# Patient Record
Sex: MALE | Race: WHITE | Employment: FULL TIME | URBAN - METROPOLITAN AREA
[De-identification: names, ages, dates, MRNs, and addresses within clinical notes are randomized per-mention and may not be internally consistent; named-entity substitution may affect disease eponyms.]

---

## 2021-07-12 ENCOUNTER — OFFICE VISIT (OUTPATIENT)
Dept: URGENT CARE | Facility: URGENT CARE | Age: 56
End: 2021-07-12
Payer: COMMERCIAL

## 2021-07-12 VITALS
RESPIRATION RATE: 12 BRPM | HEIGHT: 70 IN | OXYGEN SATURATION: 100 % | TEMPERATURE: 97.7 F | BODY MASS INDEX: 27.2 KG/M2 | WEIGHT: 190 LBS

## 2021-07-12 DIAGNOSIS — R42 VERTIGO: Primary | ICD-10-CM

## 2021-07-12 DIAGNOSIS — R03.0 ELEVATED BLOOD PRESSURE READING WITHOUT DIAGNOSIS OF HYPERTENSION: ICD-10-CM

## 2021-07-12 DIAGNOSIS — R11.2 NAUSEA AND VOMITING, INTRACTABILITY OF VOMITING NOT SPECIFIED, UNSPECIFIED VOMITING TYPE: ICD-10-CM

## 2021-07-12 PROCEDURE — 99204 OFFICE O/P NEW MOD 45 MIN: CPT | Performed by: FAMILY MEDICINE

## 2021-07-12 PROCEDURE — 93000 ELECTROCARDIOGRAM COMPLETE: CPT | Performed by: FAMILY MEDICINE

## 2021-07-12 RX ORDER — ONDANSETRON 4 MG/1
4 TABLET, FILM COATED ORAL EVERY 8 HOURS PRN
Qty: 9 TABLET | Refills: 0 | Status: SHIPPED | OUTPATIENT
Start: 2021-07-12

## 2021-07-12 RX ORDER — MECLIZINE HYDROCHLORIDE 25 MG/1
25 TABLET ORAL 3 TIMES DAILY PRN
Qty: 21 TABLET | Refills: 0 | Status: SHIPPED | OUTPATIENT
Start: 2021-07-12 | End: 2021-07-19

## 2021-07-12 ASSESSMENT — MIFFLIN-ST. JEOR: SCORE: 1698.08

## 2021-07-12 NOTE — PATIENT INSTRUCTIONS
Patient Education     Managing Dizziness (Vertigo) with Medicines    Although medicines can't cure all of your problems, they can help control your symptoms. Your doctor may prescribe medicines for a few weeks and then taper them off. Always take your medicine as prescribed. Never share your medicine with others.   Contact your healthcare provider right away if you have side effects from your medicines. Before using a new over-the-counter medicine, check with your healthcare provider or the pharmacist to be certain there won't be an interaction with other medicines you are taking.   How medicines can help    Treat infection or inflammation. If you have an infection caused by bacteria, your doctor can prescribe antibiotics.    Limit conflicting balance signals. These medicines are often in pill form.    Ease nausea. Suppositories, pills, or shots can reduce vomiting.    Reduce pressure in the canals. Diuretics can be used to treat Ménière's disease. These medicines help your body get rid of extra fluid.    Ease other symptoms. Other medicines can help ease depression and anxiety caused by living with dizziness or fainting.    [employer] last reviewed this educational content on 9/1/2019 2000-2021 [employer]. All rights reserved. This information is not intended as a substitute for professional medical care. Always follow your healthcare professional's instructions.           Patient Education     Dizziness (Vertigo) and Balance Problems: Staying Safe      Replace burned-out light bulbs to keep your home safe and well lit.   Falls or accidents can lead to pain, broken bones, a hospital stay, and a fear of future falls. Protect yourself and others by preparing for episodes. Simple steps can help you stay safe at home and wherever you go.  Lighting  Keep all areas well lit. This helps your eyes send the right signals to the brain. It also makes you less likely to trip and fall. If bright lights make  symptoms worse, dim the lights or lie in a dark room until the dizziness passes. Then turn the lights back to their normal level.  Tips:    Keep a flashlight by the bed.    Place nightlights in bathrooms and hallways.    Replace burned-out bulbs. Or have someone replace them for you.  Preventing falls  To reduce your risk of falling:    Get out of bed or up from a chair slowly.    Wear low-heeled shoes that fit properly and have slip-resistant soles.    Remove throw rugs. Clear clutter from walkways.    Use handrails on stairs. Have handrails installed or adjusted if needed.    Install grab bars in the bathroom. Don't use towel racks for balance.    Use a shower stool. Also put adhesive strips in the shower or on the tub floor.  Going out  With a little time and preparation, you can get around safely.  Tips:    Bring a cane or walking aid if needed.    Give yourself plenty of time in case you start to get dizzy.    Ask your healthcare provider what type of exercise is safe for your condition.    Be patient. If an activity such as walking through a crowded shop causes you stress, you may not be ready for it yet.  Driving  If you become dizzy or disoriented while driving, you could hurt yourself and others. That's why it's best to not drive until symptoms have gone away. In some cases, your license may be temporarily held until it's safe for you to drive again.  For safety:    Ask a friend to drive for you.    Take public transportation.    Walk to stores and other places when you can.     Don't be afraid to ask for help running errands, cooking meals, and doing exercise. Whether it's a friend, loved one, neighbor, or stranger on the street, a little help can make a world of difference. Ask your healthcare provider for a list of community resources if you need help maintaining your independence at home.  [employer] last reviewed this educational content on 1/1/2020 2000-2021 [employer]. All rights  reserved. This information is not intended as a substitute for professional medical care. Always follow your healthcare professional's instructions.           Patient Education     Benign Paroxysmal Positional Vertigo    Benign paroxysmal positional vertigo (BPPV) is a common condition. You feel as if the room is spinning after changing position, moving your head quickly, or even just rolling over in bed.  Vertigo is a false feeling of motion plus disorientation that makes it seem as if the room is spinning. A vertigo attack may cause sudden nausea, vomiting, and heavy sweating. Severe vertigo causes a loss of balance. You may even fall down.  Vertigo is caused by a problem with the inner ear. The inner ear is located behind the middle ear. It is a part of the balance center of the body. It contains small calcium particles within fluid-filled canals (semi-circular canals). These particles can move out of position. This may happen as a result of aging, head injury, or disease of the inner ear. Once that happens, moving your head in certain ways may cause the particles to stimulate the inner ear. This creates the feeling of vertigo.  An episode of vertigo may last seconds, minutes, or hours. Once you are over the first episode of vertigo, it may never return. Sometimes symptoms return off and on for several weeks or longer.  BPPV is treatable. The Epley maneuver is a simple treatment for the common cause of vertigo. Your provider may try to put the calcium particles back in their correct position by having you do a series of head movements.  Home care  Follow these guidelines when caring for yourself at home:    Rest quietly in bed if your symptoms are severe. Change position slowly. There is usually 1 position that will feel best. This might be lying on one side or lying on your back with your head slightly raised on pillows. Until you have no symptoms, you are at a higher risk of falling. Let someone help you when  you get up. Get rid of home hazards such as loose electrical cords and throw rugs. Don t walk in unfamiliar areas that aren't lighted. Use night lights in bathrooms and kitchen areas.    Don't drive or work with dangerous machinery for 1 week after symptoms go away. This is in case symptoms return suddenly.    Take medicine as prescribed to relieve your symptoms. Unless another medicine was prescribed for nausea, vomiting, and vertigo, you may use over-the-counter motion sickness medicine. Examples of this include meclizine and dimenhydrinate. Don't take over-the-counter medicine for this condition without talking with your healthcare provider, especially the first time.  Follow-up care  Follow up with your healthcare provider or an ear, nose, and throat doctor (ENT or otolaryngologist), or as directed. Tell your provider about any ringing in your ear or hearing loss.  If you had a CT or MRI scan, a specialist will review it. You'll be told of any new findings that may affect your care.  When to get medical advice  Call your healthcare provider right away if any of these occur:    Vertigo gets worse even after taking prescribed medicine    Repeated vomiting even after taking prescribed medicine    Weakness that gets worse    Trouble hearing    Fever of 100.4 F (38 C) or higher, or as directed by your healthcare provider  Call 911  Call 911 right away if any of these occur:    Fainting    Severe headache or abnormal drowsiness or confusion    Weakness of an arm or leg or one side of the face    Trouble with speech or vision    Trouble walking    Seizure    Fast heart rate    Chest pain  [employer] last reviewed this educational content on 5/1/2020 2000-2021 [employer]. All rights reserved. This information is not intended as a substitute for professional medical care. Always follow your healthcare professional's instructions.

## 2021-07-12 NOTE — PROGRESS NOTES
"SUBJECTIVE:   Justino complains of room spinning/falling/movement, off balance for today.   Timing: intermittent episodeschange in  position and movement of head only, fine when laying still.  Quality: room spinning/falling/movement, off balance.  Modifying factors:changing position, movement of head.  Associated symptoms:n/v;  does interfere with activities of daily living;  denies any previous problems with similar symptoms.   /[facility] this am    No past medical history on file.  No past surgical history on file.  Allergies:  Allergies   Allergen Reactions     Pcn [Penicillins]      There is no problem list on file for this patient.    Current Outpatient Medications   Medication Sig     meclizine (ANTIVERT) 25 MG tablet Take 1 tablet (25 mg) by mouth 3 times daily as needed for dizziness     ondansetron (ZOFRAN) 4 MG tablet Take 1 tablet (4 mg) by mouth every 8 hours as needed for nausea     No current facility-administered medications for this visit.     OBJECTIVE:  See orthostatics in [facility]  Temp 97.7  F (36.5  C) (Oral)   Resp 12   Ht 1.778 m (5' 10\")   Wt 86.2 kg (190 lb)   SpO2 100%   BMI 27.26 kg/m    Appears well, in no apparent distress.   EARS:  normal.   NECK:  Supple. No adenopathy or masses in the neck or supraclavicular regions. Cranial nerves are normal.   EYES: Fundi are normal with sharp disc margins, no papilledema, hemorrhages or exudates noted. DELGADO. EOM's intact.   NEURO: DTR's normal and symmetric. Mental status normal. Gait and station normal. Romberg negative. Cerebellar function is normal. No internuclear ophthalmoplegia.   Pulse regular. Rapid changes in position during the exam do precipitate brief dizziness with nystagmus.    EKG NSR without acute st chnages    ASSESSMENT:    ICD-10-CM    1. Vertigo  R42 EKG 12-lead complete w/read - Clinics     meclizine (ANTIVERT) 25 MG tablet   2. Elevated blood pressure reading without diagnosis of hypertension  R03.0 " EKG 12-lead complete w/read - Clinics   3. Nausea and vomiting, intractability of vomiting not specified, unspecified vomiting type  R11.2 ondansetron (ZOFRAN) 4 MG tablet     No MI by negative EKG  Pt desires to monitor BP and f/u PCP rather than start BP meds at this time, low salt diet and weight loss  PLAN:  The patient is reassured that these symptoms do not appear to represent a serious or threatening condition. This is generally a self-limited temporary but uncomfortable situation. Rest, avoid potentially dangerous activities (such as driving or working with machinery or at heights), use OTC Meclizine prn. Asked to call if develops other symptoms, such as alterations of speech, swallowing, vision, motor or sensory systems, or if dizziness persists or worsens.